# Patient Record
Sex: MALE | Race: WHITE | URBAN - METROPOLITAN AREA
[De-identification: names, ages, dates, MRNs, and addresses within clinical notes are randomized per-mention and may not be internally consistent; named-entity substitution may affect disease eponyms.]

---

## 2017-11-30 ENCOUNTER — APPOINTMENT (OUTPATIENT)
Dept: GENERAL RADIOLOGY | Facility: CLINIC | Age: 32
End: 2017-11-30
Attending: PHYSICIAN ASSISTANT

## 2017-11-30 ENCOUNTER — HOSPITAL ENCOUNTER (EMERGENCY)
Facility: CLINIC | Age: 32
Discharge: HOME | End: 2017-11-30
Attending: PHYSICIAN ASSISTANT | Admitting: PHYSICIAN ASSISTANT

## 2017-11-30 VITALS
OXYGEN SATURATION: 99 % | TEMPERATURE: 97.7 F | SYSTOLIC BLOOD PRESSURE: 125 MMHG | HEART RATE: 78 BPM | WEIGHT: 186.5 LBS | RESPIRATION RATE: 16 BRPM | BODY MASS INDEX: 23.95 KG/M2 | DIASTOLIC BLOOD PRESSURE: 78 MMHG

## 2017-11-30 DIAGNOSIS — M25.561 CHRONIC PAIN OF RIGHT KNEE: ICD-10-CM

## 2017-11-30 DIAGNOSIS — G89.29 CHRONIC PAIN OF RIGHT KNEE: ICD-10-CM

## 2017-11-30 PROCEDURE — 99284 EMERGENCY DEPT VISIT MOD MDM: CPT | Mod: Z6 | Performed by: PHYSICIAN ASSISTANT

## 2017-11-30 PROCEDURE — 29505 APPLICATION LONG LEG SPLINT: CPT | Mod: RT | Performed by: PHYSICIAN ASSISTANT

## 2017-11-30 PROCEDURE — 25000132 ZZH RX MED GY IP 250 OP 250 PS 637: Performed by: PHYSICIAN ASSISTANT

## 2017-11-30 PROCEDURE — 73562 X-RAY EXAM OF KNEE 3: CPT | Mod: TC,RT

## 2017-11-30 PROCEDURE — 99284 EMERGENCY DEPT VISIT MOD MDM: CPT | Mod: 25 | Performed by: PHYSICIAN ASSISTANT

## 2017-11-30 RX ORDER — HYDROCODONE BITARTRATE AND ACETAMINOPHEN 5; 325 MG/1; MG/1
1 TABLET ORAL EVERY 6 HOURS PRN
Qty: 6 TABLET | Refills: 0 | Status: SHIPPED | OUTPATIENT
Start: 2017-11-30

## 2017-11-30 RX ORDER — HYDROCODONE BITARTRATE AND ACETAMINOPHEN 5; 325 MG/1; MG/1
1 TABLET ORAL ONCE
Status: COMPLETED | OUTPATIENT
Start: 2017-11-30 | End: 2017-11-30

## 2017-11-30 RX ADMIN — HYDROCODONE BITARTRATE AND ACETAMINOPHEN 1 TABLET: 5; 325 TABLET ORAL at 15:15

## 2017-11-30 NOTE — ED PROVIDER NOTES
History     Chief Complaint   Patient presents with     Knee Pain     HPI  Rick Nicolas is a 32 year old male who presents for evaluation of right knee pain ongoing for the past 5 years, but much worse in the past 2 days. Patient states that symptoms started when he had a meniscal injury 5 years prior. He underwent arthroscopic debridement of this, but has had problems since then. States that he has undergone physical therapy for this without any significant improvement. Symptoms exacerbated in the past 2 days as he is moving. He notes clicking and giving way of the knee. No particular injury that he can remember. Has had some swelling of the knee joint as well. Denies any fevers or chills. He is using ibuprofen to treat his pain without any significant improvement.    Problem List:    There are no active problems to display for this patient.       Past Medical History:    History reviewed. No pertinent past medical history.    Past Surgical History:    Past Surgical History:   Procedure Laterality Date     KNEE SURGERY Right        Family History:    No family history on file.    Social History:  Marital Status:  Single [1]  Social History   Substance Use Topics     Smoking status: Current Every Day Smoker     Packs/day: 0.50     Types: Cigarettes     Smokeless tobacco: Never Used      Comment: quitting      Alcohol use No        Medications:      HYDROcodone-acetaminophen (NORCO) 5-325 MG per tablet         Review of Systems   All other systems reviewed and are negative.      Physical Exam   BP: 125/78  Pulse: 78  Heart Rate: 95  Temp: 97.7  F (36.5  C)  Resp: 16  Weight: 84.6 kg (186 lb 8 oz)  SpO2: 99 %      Physical Exam   Nursing note and vitals reviewed.  Generally healthy appearing male in NAD who is active and non-toxic appearing.   Skin:  No rashes or lesions are noted on inspection of the torso, face, and upper extremities.   Knee is normal to inspection and palpation without evidence of erythema,  warmth, or discoloration. ROM is full without crepitus.  Strength is equal and appropriate bilaterally.  Mild tenderness to palpation along the medial joint line. There does appear to be a mild joint effusion. No tenderness to palpation along the posterior knee, patella, and collateral ligaments.  No ligamentous instability with testing of the MCL, LCL, ACL, and PCL ligaments.  Rachelle's testing for meniscal injury is positive.  Patellar apprehension negative. Distal pulses 2+ bilaterally.  Sensation intact to light touch.  Opposite knee exam is normal.       ED Course     ED Course     Procedures               Critical Care time:  none              Results for orders placed or performed during the hospital encounter of 11/30/17   XR Knee Right 3 Views    Narrative    XR KNEE RT 3 VW 11/30/2017 3:27 PM    COMPARISON: None.    HISTORY: Right knee pain.      Impression    IMPRESSION: No fractures are seen in the right knee. Joints are  preserved and in normal alignment. No significant soft tissue swelling  or knee effusion.    NIVIA MENDEZ MD          Labs Ordered and Resulted from Time of ED Arrival Up to the Time of Departure from the ED - No data to display    Assessments & Plan (with Medical Decision Making)  Chronic pain of right knee     32 year old male presents for evaluation of ongoing knee pain for the past 5 years ever since undergoing arthroscopic surgery for meniscal tear. Symptoms much worse in the past 2 days exacerbated by heavy lifting and moving.   He is having clicking and giving way symptoms in the knee. Denies any new injury. On exam blood pressure 125/70, pulse 70, temperature 97.7. Mild joint effusion noted. Tenderness along the medial joint line. Rachelle's testing positive. Ligamentous testing normal. X-ray negative for fracture or bony lesion. No significant osteoarthritis. He was given hydrocodone for pain with good results. Patient was placed in a knee immobilizer and provided crutches. I  did send him home with #6 tabs of hydrocodone to use as needed for breakthrough pain, but I did instruct him to continue taking the ibuprofen. I tried to get him into orthopedics this afternoon, but there were no open appointments. He will see Dr. Anand for an evaluation tomorrow at 1:50.  A friend was present in the ED to drive him home. The patient was in agreement with this plan and was stable for discharge.      I have reviewed the nursing notes.    I have reviewed the findings, diagnosis, plan and need for follow up with the patient.       New Prescriptions    HYDROCODONE-ACETAMINOPHEN (NORCO) 5-325 MG PER TABLET    Take 1 tablet by mouth every 6 hours as needed for moderate to severe pain       Final diagnoses:   Chronic pain of right knee       Disclaimer: This note consists of symbols derived from keyboarding, dictation and/or voice recognition software. As a result, there may be errors in the script that have gone undetected. Please consider this when interpreting information found in this chart.      11/30/2017   Teo Iverson PA-C   [facility] EMERGENCY DEPARTMENT     Teo Iverson PA-C  11/30/17 1609

## 2017-11-30 NOTE — DISCHARGE INSTRUCTIONS
1.  Please keep your appointment at the [facility] tomorrow with Dr. Anand, Orthopedist.    2.  Please keep taking Ibuprofen 800 mg every 8 hours with food to treat the inflammation and pain.  It is okay to take the Hydrocodone for breakthrough pain not treated by the Ibuprofen.

## 2017-11-30 NOTE — ED AVS SNAPSHOT
[facility] Emergency Department    [address] [place] [address]    Phone:  [phone]    Fax:  [phone]                                       Rick Nicolas   MRN: [id number]    Department:  [facility] Emergency Department   Date of Visit:  11/30/2017           Patient Information     Date Of Birth          1985        Your diagnoses for this visit were:     Chronic pain of right knee        You were seen by Teo Iverson PA-C.      Follow-up Information     Follow up with Carolee Anand MD. Go in 1 day.    Specialty:  Orthopaedic Surgery    Why:  For knee pain evaluation    Contact information:    [address] [place] [id number] [phone]          Discharge Instructions       1.  Please keep your appointment at the [facility] tomorrow with Dr. Anand, Orthopedist.    2.  Please keep taking Ibuprofen 800 mg every 8 hours with food to treat the inflammation and pain.  It is okay to take the Hydrocodone for breakthrough pain not treated by the Ibuprofen.        Future Appointments        Provider Department Dept Phone Center    12/1/2017 1:50 PM Carolee Anand MD [facility] [phone] [facility]      24 Hour Appointment Hotline       To make an appointment at any [facility], call [phone] ([phone]). If you don't have a family doctor or clinic, we will help you find one. [facility] ([facility]3] are conveniently located to serve the needs of you and your family.          ED Discharge Orders     Crutches       Use gait belt during crutch training.            Knee immobilizer                    Review of your medicines      START taking        Dose / Directions Last dose taken    HYDROcodone-acetaminophen 5-325 MG per tablet   Commonly known as:  NORCO   Dose:  1 tablet   Quantity:  6 tablet        Take 1 tablet by mouth every 6 hours as needed for moderate to severe pain   Refills:  0               "  Prescriptions were sent or printed at these locations (1 Prescription)                   [facility] [address] [place] [address] - [address]   [address], [facility] [address]    Telephone:  [phone]   Fax:  [phone]   Hours:                  Printed at Department/Unit printer (1 of 1)         HYDROcodone-acetaminophen (NORCO) 5-325 MG per tablet                Procedures and tests performed during your visit     XR Knee Right 3 Views      Orders Needing Specimen Collection     None      Pending Results     No orders found from 11/28/2017 to 12/1/2017.            Pending Culture Results     No orders found from 11/28/2017 to 12/1/2017.            Pending Results Instructions     If you had any lab results that were not finalized at the time of your Discharge, you can call the ED Lab Result RN at [phone]. You will be contacted by this team for any positive Lab results or changes in treatment. The nurses are available 7 days a week from 10A to 6:30P.  You can leave a message 24 hours per day and they will return your call.        Thank you for choosing [facility]       Thank you for choosing [facility] for your care. Our goal is always to provide you with excellent care. Hearing back from our patients is one way we can continue to improve our services. Please take a few minutes to complete the written survey that you may receive in the mail after you visit with us. Thank you!        "[employer] Information     [employer] lets you send messages to your doctor, view your test results, renew your prescriptions, schedule appointments and more. To sign up, go to [email or web address] . Click on \"Log in\" on the left side of the screen, which will take you to the Welcome page. Then click on \"Sign up Now\" on the right side of the page.     You will be asked to enter the access code listed below, as well as some personal information. Please follow the directions to create your username and password.     Your " access code is: [email or web address]  Expires: 2018  3:52 PM     Your access code will  in 90 days. If you need help or a new code, please call your [facility] clinic or [phone].        Care EveryWhere ID     This is your Care EveryWhere ID. This could be used by other organizations to access your [facility] medical records  [id number]        Equal Access to Services     [facility] : Hadii eddie luis hadabhilasho Soeric, waaxda luqadaha, qaybta kaalmada adejoleneda, sina munoz . So [facility] [phone].    ATENCIÓN: Si habla cameronañol, tiene a monson disposición servicios gratuitos de asistencia lingüística. Llame al [phone].    We comply with applicable federal civil rights laws and [place] laws. We do not discriminate on the basis of race, color, national origin, age, disability, sex, sexual orientation, or gender identity.            After Visit Summary       This is your record. Keep this with you and show to your community pharmacist(s) and doctor(s) at your next visit.

## 2017-11-30 NOTE — ED AVS SNAPSHOT
[facility] Emergency Department    [address] [place] [address]    Phone:  [phone]    Fax:  [phone]                                       Rick Nicolas   MRN: [id number]    Department:  [facility] Emergency Department   Date of Visit:  11/30/2017           After Visit Summary Signature Page     I have received my discharge instructions, and my questions have been answered. I have discussed any challenges I see with this plan with the nurse or doctor.    ..........................................................................................................................................  Patient/Patient Representative Signature      ..........................................................................................................................................  Patient Representative Print Name and Relationship to Patient    ..................................................               ................................................  Date                                            Time    ..........................................................................................................................................  Reviewed by Signature/Title    ...................................................              ..............................................  Date                                                            Time